# Patient Record
Sex: MALE | Race: WHITE | Employment: FULL TIME | URBAN - METROPOLITAN AREA
[De-identification: names, ages, dates, MRNs, and addresses within clinical notes are randomized per-mention and may not be internally consistent; named-entity substitution may affect disease eponyms.]

---

## 2017-02-07 ENCOUNTER — APPOINTMENT (OUTPATIENT)
Dept: GENERAL RADIOLOGY | Facility: HOSPITAL | Age: 52
End: 2017-02-07
Attending: NURSE PRACTITIONER
Payer: COMMERCIAL

## 2017-02-07 ENCOUNTER — HOSPITAL ENCOUNTER (EMERGENCY)
Facility: HOSPITAL | Age: 52
Discharge: HOME | End: 2017-02-07
Payer: COMMERCIAL

## 2017-02-07 VITALS
DIASTOLIC BLOOD PRESSURE: 86 MMHG | OXYGEN SATURATION: 99 % | HEART RATE: 85 BPM | HEIGHT: 66 IN | TEMPERATURE: 99 F | WEIGHT: 215 LBS | RESPIRATION RATE: 16 BRPM | SYSTOLIC BLOOD PRESSURE: 145 MMHG | BODY MASS INDEX: 34.55 KG/M2

## 2017-02-07 DIAGNOSIS — S61.219A FINGER LACERATION, INITIAL ENCOUNTER: Primary | ICD-10-CM

## 2017-02-07 PROCEDURE — 12002 RPR S/N/AX/GEN/TRNK2.6-7.5CM: CPT

## 2017-02-07 PROCEDURE — 99283 EMERGENCY DEPT VISIT LOW MDM: CPT

## 2017-02-07 PROCEDURE — 73140 X-RAY EXAM OF FINGER(S): CPT

## 2017-02-07 RX ORDER — LIDOCAINE HYDROCHLORIDE 10 MG/ML
INJECTION, SOLUTION EPIDURAL; INFILTRATION; INTRACAUDAL; PERINEURAL
Status: COMPLETED
Start: 2017-02-07 | End: 2017-02-07

## 2017-02-07 RX ORDER — CEPHALEXIN 250 MG/5ML
500 POWDER, FOR SUSPENSION ORAL 4 TIMES DAILY
Qty: 280 ML | Refills: 0 | Status: SHIPPED | OUTPATIENT
Start: 2017-02-07 | End: 2017-02-14

## 2017-02-07 NOTE — ED NOTES
Pt states approx 12:00 cut left index finger on metal blade. Thinks last tetanus shot was within 5 years.

## 2017-02-07 NOTE — ED PROVIDER NOTES
Patient Seen in: [facility] AND [facility] Emergency Department    History   CC: finger laceration  HPI: Sp Burton 54year old male  who presents to the ER c/o left index finger laceration status post injury today while he was working outside.   States he ch Current:/88 mmHg  Pulse 90  Temp(Src) 99 °F (37.2 °C) (Oral)  Resp 16  Ht 167.6 cm (5' 6\")  Wt 97.523 kg  BMI 34.72 kg/m2  SpO2 98%        General - Appears well and in NAD  Skin - +5cm laceration well approximated edges noted to the dorsal as Susp  Take 10 mL (500 mg total) by mouth 4 (four) times daily.   Qty: 280 mL Refills: 0

## 2017-03-28 ENCOUNTER — OFFICE VISIT (OUTPATIENT)
Dept: FAMILY MEDICINE CLINIC | Facility: CLINIC | Age: 52
End: 2017-03-28

## 2017-03-28 VITALS
BODY MASS INDEX: 35.36 KG/M2 | TEMPERATURE: 97 F | HEIGHT: 66 IN | HEART RATE: 63 BPM | SYSTOLIC BLOOD PRESSURE: 130 MMHG | WEIGHT: 220 LBS | DIASTOLIC BLOOD PRESSURE: 84 MMHG

## 2017-03-28 DIAGNOSIS — Z91.030 ALLERGIC TO BEES: Primary | ICD-10-CM

## 2017-03-28 PROCEDURE — 99212 OFFICE O/P EST SF 10 MIN: CPT | Performed by: FAMILY MEDICINE

## 2017-03-28 PROCEDURE — 99213 OFFICE O/P EST LOW 20 MIN: CPT | Performed by: FAMILY MEDICINE

## 2017-03-28 RX ORDER — EPINEPHRINE 0.3 MG/.3ML
0.3 INJECTION SUBCUTANEOUS ONCE
Qty: 2 EACH | Refills: 3 | Status: SHIPPED | OUTPATIENT
Start: 2017-03-28 | End: 2018-05-15

## 2017-03-28 NOTE — PROGRESS NOTES
Patient ID: Ivis Lewis is a 54year old male. HPI  Patient presents with:  New Prescription: pt wants epi pen. allergic to bees    He is allergic to bees. He found this out in 2004 when he started having highs and his throat was closing down.   Stevo Givens person, place, and time. Skin: Skin is warm and dry. Psychiatric: Patient has a normal mood and affect. Nursing note and vitals reviewed.          ASSESSMENT/PLAN:     Diagnoses and all orders for this visit:    Allergic to bees  -     EPINEPHrine (EP

## 2017-04-20 ENCOUNTER — OFFICE VISIT (OUTPATIENT)
Dept: OTOLARYNGOLOGY | Facility: CLINIC | Age: 52
End: 2017-04-20

## 2017-04-20 ENCOUNTER — OFFICE VISIT (OUTPATIENT)
Dept: AUDIOLOGY | Facility: CLINIC | Age: 52
End: 2017-04-20

## 2017-04-20 VITALS
TEMPERATURE: 98 F | BODY MASS INDEX: 34.07 KG/M2 | SYSTOLIC BLOOD PRESSURE: 144 MMHG | DIASTOLIC BLOOD PRESSURE: 92 MMHG | WEIGHT: 212 LBS | HEIGHT: 66 IN

## 2017-04-20 DIAGNOSIS — R42 DIZZINESS: Primary | ICD-10-CM

## 2017-04-20 PROCEDURE — 99203 OFFICE O/P NEW LOW 30 MIN: CPT | Performed by: OTOLARYNGOLOGY

## 2017-04-20 PROCEDURE — 92557 COMPREHENSIVE HEARING TEST: CPT | Performed by: AUDIOLOGIST

## 2017-04-20 PROCEDURE — 99212 OFFICE O/P EST SF 10 MIN: CPT | Performed by: OTOLARYNGOLOGY

## 2017-04-20 PROCEDURE — 92567 TYMPANOMETRY: CPT | Performed by: AUDIOLOGIST

## 2017-04-20 RX ORDER — MECLIZINE HYDROCHLORIDE 25 MG/1
TABLET ORAL
Qty: 20 TABLET | Refills: 0 | Status: SHIPPED | OUTPATIENT
Start: 2017-04-20 | End: 2018-01-08

## 2017-04-20 NOTE — PROGRESS NOTES
Arslan Kirk is a 54year old male. Patient presents with:  Dizziness: Patient c/o dizziness when he lay on his left side. HISTORY OF PRESENT ILLNESS  4/20/2017 4/20/2017   The patient has complaints of dizziness. Pt reports rotary vertigo.  Sx's (36.8 °C)  Ht 5' 6\" (1.676 m)  Wt 212 lb (96.163 kg)  BMI 34.23 kg/m2       Constitutional Normal Overall appearance - Normal.   Psychiatric Normal Orientation - Oriented to time, place, person & situation. Appropriate mood and affect.    Neck Exam Normal allergy, or preceding illness. Since the patient has no other symptoms such as: hearing loss, pain, double vision, numbness or weakness or other cranial nerve issues I did not recommend an MRI at this time.  I recommend renetta's exercises and followup if

## 2017-04-20 NOTE — PROGRESS NOTES
AUDIOGRAM     Ayala Duran was referred for testing by Perico Hackett for dizziness. 6/21/1965  [id number]    Otoscopic inspection: right ear no cerumen; left ear no cerumen.        Tests/Procedures  Patient was tested via  standard insert earphones and

## 2018-01-08 ENCOUNTER — OFFICE VISIT (OUTPATIENT)
Dept: FAMILY MEDICINE CLINIC | Facility: CLINIC | Age: 53
End: 2018-01-08

## 2018-01-08 ENCOUNTER — APPOINTMENT (OUTPATIENT)
Dept: LAB | Age: 53
End: 2018-01-08
Attending: NURSE PRACTITIONER
Payer: COMMERCIAL

## 2018-01-08 VITALS
SYSTOLIC BLOOD PRESSURE: 128 MMHG | HEIGHT: 66 IN | WEIGHT: 222 LBS | DIASTOLIC BLOOD PRESSURE: 82 MMHG | BODY MASS INDEX: 35.68 KG/M2

## 2018-01-08 DIAGNOSIS — Z00.00 WELL ADULT EXAM: ICD-10-CM

## 2018-01-08 DIAGNOSIS — Z12.5 SCREENING PSA (PROSTATE SPECIFIC ANTIGEN): ICD-10-CM

## 2018-01-08 DIAGNOSIS — Z00.00 WELL ADULT EXAM: Primary | ICD-10-CM

## 2018-01-08 DIAGNOSIS — Z12.11 SCREENING FOR COLON CANCER: ICD-10-CM

## 2018-01-08 LAB
ALBUMIN SERPL BCP-MCNC: 4.4 G/DL (ref 3.5–4.8)
ALBUMIN/GLOB SERPL: 2.1 {RATIO} (ref 1–2)
ALP SERPL-CCNC: 56 U/L (ref 32–100)
ALT SERPL-CCNC: 64 U/L (ref 17–63)
ANION GAP SERPL CALC-SCNC: 6 MMOL/L (ref 0–18)
AST SERPL-CCNC: 26 U/L (ref 15–41)
BILIRUB SERPL-MCNC: 1.1 MG/DL (ref 0.3–1.2)
BILIRUB UR QL: NEGATIVE
BUN SERPL-MCNC: 13 MG/DL (ref 8–20)
BUN/CREAT SERPL: 17.6 (ref 10–20)
CALCIUM SERPL-MCNC: 9.1 MG/DL (ref 8.5–10.5)
CHLORIDE SERPL-SCNC: 108 MMOL/L (ref 95–110)
CHOLEST SERPL-MCNC: 170 MG/DL (ref 110–200)
CLARITY UR: CLEAR
CO2 SERPL-SCNC: 26 MMOL/L (ref 22–32)
COLOR UR: YELLOW
CREAT SERPL-MCNC: 0.74 MG/DL (ref 0.5–1.5)
ERYTHROCYTE [DISTWIDTH] IN BLOOD BY AUTOMATED COUNT: 13.5 % (ref 11–15)
GLOBULIN PLAS-MCNC: 2.1 G/DL (ref 2.5–3.7)
GLUCOSE SERPL-MCNC: 98 MG/DL (ref 70–99)
GLUCOSE UR-MCNC: NEGATIVE MG/DL
HBA1C MFR BLD: 5.4 % (ref 4–6)
HCT VFR BLD AUTO: 45.8 % (ref 41–52)
HDLC SERPL-MCNC: 36 MG/DL
HGB BLD-MCNC: 15.9 G/DL (ref 13.5–17.5)
HGB UR QL STRIP.AUTO: NEGATIVE
KETONES UR-MCNC: NEGATIVE MG/DL
LDLC SERPL CALC-MCNC: 114 MG/DL (ref 0–99)
LEUKOCYTE ESTERASE UR QL STRIP.AUTO: NEGATIVE
MCH RBC QN AUTO: 30.2 PG (ref 27–32)
MCHC RBC AUTO-ENTMCNC: 34.7 G/DL (ref 32–37)
MCV RBC AUTO: 87.2 FL (ref 80–100)
NITRITE UR QL STRIP.AUTO: NEGATIVE
NONHDLC SERPL-MCNC: 134 MG/DL
OSMOLALITY UR CALC.SUM OF ELEC: 290 MOSM/KG (ref 275–295)
PH UR: 6 [PH] (ref 5–8)
PLATELET # BLD AUTO: 100 K/UL (ref 140–400)
PMV BLD AUTO: 12.2 FL (ref 7.4–10.3)
POTASSIUM SERPL-SCNC: 3.6 MMOL/L (ref 3.3–5.1)
PROT SERPL-MCNC: 6.5 G/DL (ref 5.9–8.4)
PROT UR-MCNC: NEGATIVE MG/DL
PSA SERPL-MCNC: 0.5 NG/ML (ref 0–4)
RBC # BLD AUTO: 5.26 M/UL (ref 4.5–5.9)
SODIUM SERPL-SCNC: 140 MMOL/L (ref 136–144)
SP GR UR STRIP: 1.02 (ref 1–1.03)
TRIGL SERPL-MCNC: 98 MG/DL (ref 1–149)
TSH SERPL-ACNC: 4.58 UIU/ML (ref 0.45–5.33)
UROBILINOGEN UR STRIP-ACNC: <2
VIT B12 SERPL-MCNC: >1500 PG/ML (ref 181–914)
VIT C UR-MCNC: NEGATIVE MG/DL
WBC # BLD AUTO: 6.6 K/UL (ref 4–11)

## 2018-01-08 PROCEDURE — 80061 LIPID PANEL: CPT

## 2018-01-08 PROCEDURE — 84443 ASSAY THYROID STIM HORMONE: CPT

## 2018-01-08 PROCEDURE — 83036 HEMOGLOBIN GLYCOSYLATED A1C: CPT

## 2018-01-08 PROCEDURE — 82607 VITAMIN B-12: CPT

## 2018-01-08 PROCEDURE — 36415 COLL VENOUS BLD VENIPUNCTURE: CPT

## 2018-01-08 PROCEDURE — 82306 VITAMIN D 25 HYDROXY: CPT

## 2018-01-08 PROCEDURE — 99396 PREV VISIT EST AGE 40-64: CPT | Performed by: NURSE PRACTITIONER

## 2018-01-08 PROCEDURE — 81003 URINALYSIS AUTO W/O SCOPE: CPT

## 2018-01-08 PROCEDURE — 85027 COMPLETE CBC AUTOMATED: CPT

## 2018-01-08 PROCEDURE — 80053 COMPREHEN METABOLIC PANEL: CPT

## 2018-01-08 NOTE — PROGRESS NOTES
HPI    Pt presents for annual medial exam.  Denies acute or chronic health concerns or problems. Would like labs ordered today. Would like to do Fit testing instead of colonoscopy-had colonoscopy at age 41. Declines flu shot.     Review of Systems   Cons on file     Other Topics Concern   None on file     Social History Narrative   None on file         Current Outpatient Prescriptions:  NEXIUM 40 MG Oral Capsule Delayed Release 1 CAPSULE(S) BY MOUTH DAILY Disp:  Rfl: 4       Allergies:    Bee Venom Placed This Encounter      CBC, Platelet, No Differential [E]      Comp Metabolic Panel (14) [E]      Hemoglobin A1C [E]      Lipid Panel [E]      PSA (Screening) [E]      TSH W Reflex To Free T4 [E]      Urinalysis, Routine [E]      Vitamin B12 [E]      V

## 2018-01-10 ENCOUNTER — APPOINTMENT (OUTPATIENT)
Dept: LAB | Age: 53
End: 2018-01-10
Attending: NURSE PRACTITIONER
Payer: COMMERCIAL

## 2018-01-10 DIAGNOSIS — Z12.11 SCREENING FOR COLON CANCER: ICD-10-CM

## 2018-01-10 LAB — 25(OH)D3 SERPL-MCNC: 31.4 NG/ML

## 2018-01-10 PROCEDURE — 82274 ASSAY TEST FOR BLOOD FECAL: CPT

## 2018-01-11 DIAGNOSIS — E78.5 HYPERLIPIDEMIA, UNSPECIFIED HYPERLIPIDEMIA TYPE: ICD-10-CM

## 2018-01-11 DIAGNOSIS — R74.8 ELEVATED LIVER ENZYMES: ICD-10-CM

## 2018-01-11 DIAGNOSIS — D69.6 THROMBOCYTOPENIA (HCC): Primary | ICD-10-CM

## 2018-01-11 LAB — HEMOCCULT STL QL: NEGATIVE

## 2018-05-15 DIAGNOSIS — Z91.030 ALLERGIC TO BEES: ICD-10-CM

## 2018-05-15 RX ORDER — EPINEPHRINE 0.3 MG/.3ML
0.3 INJECTION SUBCUTANEOUS ONCE
Qty: 2 EACH | Refills: 3 | Status: SHIPPED | OUTPATIENT
Start: 2018-05-15 | End: 2019-05-21

## 2018-05-15 NOTE — TELEPHONE ENCOUNTER
Pt called in stating that he has been waiting on a request for a refill on script below. Pt was informed by his pharmacy that nothing was sent over.  Pt is request 2 packs of 2 pens (one for work and one for home) at the cheapest generic brand he can receiv

## 2018-05-16 ENCOUNTER — TELEPHONE (OUTPATIENT)
Dept: FAMILY MEDICINE CLINIC | Facility: CLINIC | Age: 53
End: 2018-05-16

## 2018-05-16 NOTE — TELEPHONE ENCOUNTER
Kiera Hassan. APN see message below from [facility] and advise.     Refill Protocol Appointment Criteria  · Appointment scheduled in the past 6 months or in the next 3 months  Recent Outpatient Visits            4 months ago Well adult exam    [address]

## 2018-05-17 NOTE — TELEPHONE ENCOUNTER
Advised [facility] pharmacist Jeri Jerez of Chema Baltazar note. Pharmacist verbalized understanding.

## 2018-09-22 ENCOUNTER — APPOINTMENT (OUTPATIENT)
Dept: LAB | Age: 53
End: 2018-09-22
Attending: NURSE PRACTITIONER
Payer: COMMERCIAL

## 2018-09-22 DIAGNOSIS — E78.5 HYPERLIPIDEMIA, UNSPECIFIED HYPERLIPIDEMIA TYPE: ICD-10-CM

## 2018-09-22 DIAGNOSIS — D69.6 THROMBOCYTOPENIA (HCC): ICD-10-CM

## 2018-09-22 DIAGNOSIS — R74.8 ELEVATED LIVER ENZYMES: ICD-10-CM

## 2018-09-22 LAB
ALBUMIN SERPL BCP-MCNC: 4.2 G/DL (ref 3.5–4.8)
ALBUMIN/GLOB SERPL: 1.9 {RATIO} (ref 1–2)
ALP SERPL-CCNC: 71 U/L (ref 32–100)
ALT SERPL-CCNC: 38 U/L (ref 17–63)
ANION GAP SERPL CALC-SCNC: 7 MMOL/L (ref 0–18)
AST SERPL-CCNC: 24 U/L (ref 15–41)
BILIRUB SERPL-MCNC: 0.9 MG/DL (ref 0.3–1.2)
BUN SERPL-MCNC: 18 MG/DL (ref 8–20)
BUN/CREAT SERPL: 25.7 (ref 10–20)
CALCIUM SERPL-MCNC: 9.1 MG/DL (ref 8.5–10.5)
CHLORIDE SERPL-SCNC: 109 MMOL/L (ref 95–110)
CHOLEST SERPL-MCNC: 143 MG/DL (ref 110–200)
CO2 SERPL-SCNC: 26 MMOL/L (ref 22–32)
CREAT SERPL-MCNC: 0.7 MG/DL (ref 0.5–1.5)
ERYTHROCYTE [DISTWIDTH] IN BLOOD BY AUTOMATED COUNT: 13.1 % (ref 11–15)
GLOBULIN PLAS-MCNC: 2.2 G/DL (ref 2.5–3.7)
GLUCOSE SERPL-MCNC: 101 MG/DL (ref 70–99)
HCT VFR BLD AUTO: 42.4 % (ref 41–52)
HDLC SERPL-MCNC: 42 MG/DL
HGB BLD-MCNC: 14.5 G/DL (ref 13.5–17.5)
LDLC SERPL CALC-MCNC: 91 MG/DL (ref 0–99)
MCH RBC QN AUTO: 30 PG (ref 27–32)
MCHC RBC AUTO-ENTMCNC: 34.3 G/DL (ref 32–37)
MCV RBC AUTO: 87.4 FL (ref 80–100)
NONHDLC SERPL-MCNC: 101 MG/DL
OSMOLALITY UR CALC.SUM OF ELEC: 296 MOSM/KG (ref 275–295)
PATIENT FASTING: YES
PLATELET # BLD AUTO: 97 K/UL (ref 140–400)
PMV BLD AUTO: 11.5 FL (ref 7.4–10.3)
POTASSIUM SERPL-SCNC: 3.9 MMOL/L (ref 3.3–5.1)
PROT SERPL-MCNC: 6.4 G/DL (ref 5.9–8.4)
RBC # BLD AUTO: 4.85 M/UL (ref 4.5–5.9)
SODIUM SERPL-SCNC: 142 MMOL/L (ref 136–144)
TRIGL SERPL-MCNC: 48 MG/DL (ref 1–149)
WBC # BLD AUTO: 5.2 K/UL (ref 4–11)

## 2018-09-22 PROCEDURE — 36415 COLL VENOUS BLD VENIPUNCTURE: CPT

## 2018-09-22 PROCEDURE — 80061 LIPID PANEL: CPT

## 2018-09-22 PROCEDURE — 85027 COMPLETE CBC AUTOMATED: CPT

## 2018-09-22 PROCEDURE — 80053 COMPREHEN METABOLIC PANEL: CPT

## 2018-09-23 DIAGNOSIS — D69.6 THROMBOCYTOPENIA (HCC): Primary | ICD-10-CM

## 2018-10-08 ENCOUNTER — OFFICE VISIT (OUTPATIENT)
Dept: HEMATOLOGY/ONCOLOGY | Facility: HOSPITAL | Age: 53
End: 2018-10-08
Attending: INTERNAL MEDICINE
Payer: COMMERCIAL

## 2018-10-08 VITALS
BODY MASS INDEX: 33.59 KG/M2 | SYSTOLIC BLOOD PRESSURE: 125 MMHG | WEIGHT: 209 LBS | HEART RATE: 90 BPM | DIASTOLIC BLOOD PRESSURE: 77 MMHG | TEMPERATURE: 99 F | RESPIRATION RATE: 18 BRPM | HEIGHT: 66 IN

## 2018-10-08 DIAGNOSIS — D69.6 THROMBOCYTOPENIA (HCC): Primary | ICD-10-CM

## 2018-10-08 PROCEDURE — 99244 OFF/OP CNSLTJ NEW/EST MOD 40: CPT | Performed by: INTERNAL MEDICINE

## 2018-10-09 NOTE — CONSULTS
[facility]    PATIENT'S NAME: Ridge Sanches   CONSULTING PHYSICIAN: Gurjit Block MD   PATIENT ACCOUNT #: [de-identified] LOCATION: [address] [place] [address] RECORD #: [id number] YOB: 1965   CONSULTATION DATE: 10/08/2018       CANCER CENT lesions. LYMPHATICS:  No palpable peripheral adenopathy on survey. NEUROLOGIC:  Moving all extremities. Cranial nerves intact. PSYCHIATRIC:  Appropriate mood, appropriate affect. MUSCULOSKELETAL:  No deformity or bony tenderness.     LABORATORY DATA:

## 2018-12-20 ENCOUNTER — TELEPHONE (OUTPATIENT)
Dept: HEMATOLOGY/ONCOLOGY | Facility: HOSPITAL | Age: 53
End: 2018-12-20

## 2019-02-04 ENCOUNTER — APPOINTMENT (OUTPATIENT)
Dept: HEMATOLOGY/ONCOLOGY | Facility: HOSPITAL | Age: 54
End: 2019-02-04
Attending: INTERNAL MEDICINE
Payer: COMMERCIAL

## 2019-02-05 ENCOUNTER — APPOINTMENT (OUTPATIENT)
Dept: HEMATOLOGY/ONCOLOGY | Facility: HOSPITAL | Age: 54
End: 2019-02-05
Attending: INTERNAL MEDICINE
Payer: COMMERCIAL

## 2019-02-08 ENCOUNTER — LAB ENCOUNTER (OUTPATIENT)
Dept: LAB | Age: 54
End: 2019-02-08
Attending: INTERNAL MEDICINE
Payer: COMMERCIAL

## 2019-02-08 DIAGNOSIS — D69.6 THROMBOCYTOPENIA (HCC): ICD-10-CM

## 2019-02-08 LAB
BASOPHILS # BLD AUTO: 0.04 X10(3) UL (ref 0–0.2)
BASOPHILS NFR BLD AUTO: 0.7 %
DEPRECATED RDW RBC AUTO: 39.8 FL (ref 35.1–46.3)
EOSINOPHIL # BLD AUTO: 0.27 X10(3) UL (ref 0–0.7)
EOSINOPHIL NFR BLD AUTO: 4.6 %
ERYTHROCYTE [DISTWIDTH] IN BLOOD BY AUTOMATED COUNT: 12.8 % (ref 11–15)
HCT VFR BLD AUTO: 44.4 % (ref 39–53)
HGB BLD-MCNC: 15.3 G/DL (ref 13–17.5)
IMM GRANULOCYTES # BLD AUTO: 0.02 X10(3) UL (ref 0–1)
IMM GRANULOCYTES NFR BLD: 0.3 %
LYMPHOCYTES # BLD AUTO: 1.83 X10(3) UL (ref 1–4)
LYMPHOCYTES NFR BLD AUTO: 31.1 %
MCH RBC QN AUTO: 29.9 PG (ref 26–34)
MCHC RBC AUTO-ENTMCNC: 34.5 G/DL (ref 31–37)
MCV RBC AUTO: 86.9 FL (ref 80–100)
MONOCYTES # BLD AUTO: 0.45 X10(3) UL (ref 0.1–1)
MONOCYTES NFR BLD AUTO: 7.6 %
NEUTROPHILS # BLD AUTO: 3.28 X10 (3) UL (ref 1.5–7.7)
NEUTROPHILS # BLD AUTO: 3.28 X10(3) UL (ref 1.5–7.7)
NEUTROPHILS NFR BLD AUTO: 55.7 %
PLATELET # BLD AUTO: 117 10(3)UL (ref 150–450)
RBC # BLD AUTO: 5.11 X10(6)UL (ref 4.3–5.7)
WBC # BLD AUTO: 5.9 X10(3) UL (ref 4–11)

## 2019-02-08 PROCEDURE — 85060 BLOOD SMEAR INTERPRETATION: CPT

## 2019-02-08 PROCEDURE — 85025 COMPLETE CBC W/AUTO DIFF WBC: CPT

## 2019-02-08 PROCEDURE — 36415 COLL VENOUS BLD VENIPUNCTURE: CPT

## 2019-02-13 ENCOUNTER — OFFICE VISIT (OUTPATIENT)
Dept: FAMILY MEDICINE CLINIC | Facility: CLINIC | Age: 54
End: 2019-02-13
Payer: COMMERCIAL

## 2019-02-13 VITALS
HEART RATE: 86 BPM | BODY MASS INDEX: 35.2 KG/M2 | SYSTOLIC BLOOD PRESSURE: 126 MMHG | HEIGHT: 66 IN | WEIGHT: 219 LBS | DIASTOLIC BLOOD PRESSURE: 76 MMHG

## 2019-02-13 DIAGNOSIS — Z00.00 WELL ADULT HEALTH CHECK: ICD-10-CM

## 2019-02-13 DIAGNOSIS — E78.5 HYPERLIPIDEMIA, UNSPECIFIED HYPERLIPIDEMIA TYPE: Primary | ICD-10-CM

## 2019-02-13 DIAGNOSIS — D69.6 THROMBOCYTOPENIA (HCC): ICD-10-CM

## 2019-02-13 DIAGNOSIS — Z12.11 SCREENING FOR COLON CANCER: ICD-10-CM

## 2019-02-13 DIAGNOSIS — R74.8 ELEVATED LIVER ENZYMES: ICD-10-CM

## 2019-02-13 DIAGNOSIS — Z12.5 SCREENING PSA (PROSTATE SPECIFIC ANTIGEN): ICD-10-CM

## 2019-02-13 PROBLEM — Z00.129 WELL ADOLESCENT VISIT WITHOUT ABNORMAL FINDINGS: Status: ACTIVE | Noted: 2019-02-13

## 2019-02-13 PROCEDURE — 99396 PREV VISIT EST AGE 40-64: CPT | Performed by: NURSE PRACTITIONER

## 2019-02-14 NOTE — PATIENT INSTRUCTIONS
Well-Child Checkup: 15 to 25 Years     Stay involved in your teen’s life. Make sure your teen knows you’re always there when he or she needs to talk. During the teen years, it’s important to keep having yearly checkups.  Your teen may be embarrassed abo · Body changes. The body grows and matures during puberty. Hair will grow in the pubic area and on other parts of the body. Girls grow breasts and menstruate (have monthly periods). A boy’s voice changes, becoming lower and deeper.  As the penis matures, er · Eat healthy. Your child should eat fruits, vegetables, lean meats, and whole grains every day. Less healthy foods—like french fries, candy, and chips—should be eaten rarely.  Some teens fall into the trap of snacking on junk food and fast food throughout · Encourage your teen to keep a consistent bedtime, even on weekends. Sleeping is easier when the body follows a routine. Don’t let your teen stay up too late at night or sleep in too long in the morning. · Help your teen wake up, if needed.  Go into the b · Set rules and limits around driving and use of the car. If your teen gets a ticket or has an accident, there should be consequences. Driving is a privilege that can be taken away if your child doesn’t follow the rules.   · Teach your child to make good de © 0535-9013 [employer] [address]. [address] [facility], [address] [place]. All rights reserved. This information is not intended as a substitute for professional medical care. Always follow your healthcare professional's instructions.         Prevent Type 2 diabetes All men with prediabetes Every year   Hepatitis C Men at increased risk for infection – talk with your healthcare provider At routine exams. All men ages 48 to 79 should be tested at least once for hepatitis C.    High cholesterol or triglyc Measles, mumps, rubella (MMR) Men in this age group through their late 46s who have no record of these infections or vaccines 1 or 2 doses; ask your healthcare provider   Meningococcal Men at increased risk for infection – talk with your healthcare provide Your healthcare provider will give you information on diet changes you may need to make. Your provider may recommend that you see a registered dietitian for help with diet changes.  Changes may include:  · Cutting back on the amount of fat and cholesterol i If you are overweight or obese, your healthcare provider will work with you to help you lose weight and lower your BMI (body mass index).  Making diet changes and getting more physical activity can help. Changing your diet will help you lose weight more eas · Don’t skip a dose or stop taking your medicine because you feel better or because your cholesterol numbers go down. Never stop taking your medicine unless your healthcare provider has told you it’s OK.   · Ask your healthcare provider if you have any ques © 8700-5437 [employer] [address]. [address] [facility], [facility] [place]. All rights reserved. This information is not intended as a substitute for professional medical care. Always follow your healthcare professional's instructions.

## 2019-02-14 NOTE — PROGRESS NOTES
HPI  Pt here for physical.   Was seen by hematology in October 2018 for thrombocytopenia. Cbc done yesterday. Denies acute or chronic complaints    Trying to watch diet. Exercises daily. Colonoscopy-done at age 42 years. Declines flu shot.    R file      Highest education level: Not on file    Occupational History      Not on file    Social Needs      Financial resource strain: Not on file      Food insecurity:        Worry: Not on file        Inability: Not on file      Transportation needs: well-nourished. No distress. HENT:   Head: Normocephalic and atraumatic.    Right Ear: Tympanic membrane and ear canal normal. No cerumen present  Left Ear: Tympanic membrane and ear canal normal. No cerumen present  Eyes: Conjunctivae and EOM are normal. combination of weight resistance and cardio is preferred. Relevant Orders    COMP METABOLIC PANEL (14)    LIPID PANEL    PSA SCREEN    TSH W REFLEX TO FREE T4                  Discussed plan of care with pt and pt is in agreement. All questions

## 2019-02-15 ENCOUNTER — APPOINTMENT (OUTPATIENT)
Dept: HEMATOLOGY/ONCOLOGY | Facility: HOSPITAL | Age: 54
End: 2019-02-15
Attending: INTERNAL MEDICINE
Payer: COMMERCIAL

## 2019-02-17 NOTE — ASSESSMENT & PLAN NOTE
Screening labs  Declines flu shot  Refer gi colonoscopy    Please aim to eat a diet high in fresh fruits and vegetables, lean protein sources, complex carbohydrates and limited processed and fast foods.   Try to get at least 150 minutes of exercise per week

## 2019-03-12 ENCOUNTER — APPOINTMENT (OUTPATIENT)
Dept: LAB | Age: 54
End: 2019-03-12
Attending: NURSE PRACTITIONER
Payer: COMMERCIAL

## 2019-03-12 DIAGNOSIS — R74.8 ELEVATED LIVER ENZYMES: ICD-10-CM

## 2019-03-12 DIAGNOSIS — Z00.00 WELL ADULT HEALTH CHECK: ICD-10-CM

## 2019-03-12 DIAGNOSIS — E78.2 MIXED HYPERLIPIDEMIA: ICD-10-CM

## 2019-03-12 DIAGNOSIS — R74.8 ELEVATED LIVER ENZYMES: Primary | ICD-10-CM

## 2019-03-12 DIAGNOSIS — Z12.5 SCREENING PSA (PROSTATE SPECIFIC ANTIGEN): ICD-10-CM

## 2019-03-12 DIAGNOSIS — E78.5 HYPERLIPIDEMIA, UNSPECIFIED HYPERLIPIDEMIA TYPE: ICD-10-CM

## 2019-03-12 LAB
ALBUMIN SERPL-MCNC: 4.2 G/DL (ref 3.4–5)
ALBUMIN/GLOB SERPL: 1.6 {RATIO} (ref 1–2)
ALP LIVER SERPL-CCNC: 71 U/L (ref 45–117)
ALT SERPL-CCNC: 73 U/L (ref 16–61)
ANION GAP SERPL CALC-SCNC: 7 MMOL/L (ref 0–18)
AST SERPL-CCNC: 27 U/L (ref 15–37)
BILIRUB SERPL-MCNC: 0.8 MG/DL (ref 0.1–2)
BUN BLD-MCNC: 14 MG/DL (ref 7–18)
BUN/CREAT SERPL: 17.1 (ref 10–20)
CALCIUM BLD-MCNC: 9.2 MG/DL (ref 8.5–10.1)
CHLORIDE SERPL-SCNC: 106 MMOL/L (ref 98–107)
CHOLEST SMN-MCNC: 163 MG/DL (ref ?–200)
CO2 SERPL-SCNC: 28 MMOL/L (ref 21–32)
COMPLEXED PSA SERPL-MCNC: 0.61 NG/ML (ref ?–4)
CREAT BLD-MCNC: 0.82 MG/DL (ref 0.7–1.3)
GLOBULIN PLAS-MCNC: 2.7 G/DL (ref 2.8–4.4)
GLUCOSE BLD-MCNC: 86 MG/DL (ref 70–99)
HDLC SERPL-MCNC: 33 MG/DL (ref 40–59)
LDLC SERPL CALC-MCNC: 100 MG/DL (ref ?–100)
M PROTEIN MFR SERPL ELPH: 6.9 G/DL (ref 6.4–8.2)
NONHDLC SERPL-MCNC: 130 MG/DL (ref ?–130)
OSMOLALITY SERPL CALC.SUM OF ELEC: 292 MOSM/KG (ref 275–295)
POTASSIUM SERPL-SCNC: 3.7 MMOL/L (ref 3.5–5.1)
SODIUM SERPL-SCNC: 141 MMOL/L (ref 136–145)
TRIGL SERPL-MCNC: 150 MG/DL (ref 30–149)
TSI SER-ACNC: 3.74 MIU/ML (ref 0.36–3.74)
VLDLC SERPL CALC-MCNC: 30 MG/DL (ref 0–30)

## 2019-03-12 PROCEDURE — 80053 COMPREHEN METABOLIC PANEL: CPT

## 2019-03-12 PROCEDURE — 36415 COLL VENOUS BLD VENIPUNCTURE: CPT

## 2019-03-12 PROCEDURE — 84443 ASSAY THYROID STIM HORMONE: CPT

## 2019-03-12 PROCEDURE — 80061 LIPID PANEL: CPT

## 2019-05-10 ENCOUNTER — HOSPITAL ENCOUNTER (OUTPATIENT)
Age: 54
Discharge: HOME | End: 2019-05-10
Attending: EMERGENCY MEDICINE
Payer: COMMERCIAL

## 2019-05-10 VITALS
WEIGHT: 210 LBS | BODY MASS INDEX: 33.75 KG/M2 | HEART RATE: 97 BPM | HEIGHT: 66 IN | OXYGEN SATURATION: 98 % | DIASTOLIC BLOOD PRESSURE: 80 MMHG | TEMPERATURE: 99 F | SYSTOLIC BLOOD PRESSURE: 158 MMHG | RESPIRATION RATE: 18 BRPM

## 2019-05-10 DIAGNOSIS — H66.001 NON-RECURRENT ACUTE SUPPURATIVE OTITIS MEDIA OF RIGHT EAR WITHOUT SPONTANEOUS RUPTURE OF TYMPANIC MEMBRANE: Primary | ICD-10-CM

## 2019-05-10 PROCEDURE — 99213 OFFICE O/P EST LOW 20 MIN: CPT

## 2019-05-10 PROCEDURE — 99214 OFFICE O/P EST MOD 30 MIN: CPT

## 2019-05-10 RX ORDER — AMOXICILLIN 500 MG/1
500 TABLET, FILM COATED ORAL 3 TIMES DAILY
Qty: 30 TABLET | Refills: 0 | Status: SHIPPED | OUTPATIENT
Start: 2019-05-10 | End: 2019-05-20

## 2019-05-10 NOTE — ED PROVIDER NOTES
Patient Seen in: [facility] Immediate Care In [address] [place] [address]    History   Patient presents with:  Cough/URI  Ear Problem Pain (neurosensory)    Stated Complaint: ear pain, sore throat    HPI    49 yo male with two weeks of URI symptoms with considerable sounds and intact distal pulses. Pulmonary/Chest: Effort normal and breath sounds normal.   Musculoskeletal: Normal range of motion. He exhibits no edema or tenderness. Lymphadenopathy:     He has cervical adenopathy.    Neurological: He is alert and or

## 2019-05-21 DIAGNOSIS — Z91.030 ALLERGIC TO BEES: ICD-10-CM

## 2019-05-22 RX ORDER — EPINEPHRINE 0.3 MG/.3ML
INJECTION SUBCUTANEOUS
Qty: 3 EACH | Refills: 1 | Status: SHIPPED | OUTPATIENT
Start: 2019-05-22 | End: 2021-03-12

## 2019-05-22 NOTE — TELEPHONE ENCOUNTER
LR 5-15-18 # 2 + 3    Review pended refill request as it does not fall under a protocol.   Requested Prescriptions     Pending Prescriptions Disp Refills   • EPINEPHRINE 0.3 MG/0.3ML Injection Solution Auto-injector [Pharmacy Med Name: EPINEPHRINE 0.3 MG

## 2019-06-08 ENCOUNTER — TELEPHONE (OUTPATIENT)
Dept: FAMILY MEDICINE CLINIC | Facility: CLINIC | Age: 54
End: 2019-06-08

## 2019-06-08 DIAGNOSIS — Z12.11 ENCOUNTER FOR SCREENING FECAL OCCULT BLOOD TESTING: Primary | ICD-10-CM

## 2019-06-08 NOTE — TELEPHONE ENCOUNTER
Patient states KK ordered a colonoscopy , Patient states he does not want to get colonoscopy, patient wants to know if there is any other option for him ? Please advise.

## 2019-06-14 ENCOUNTER — APPOINTMENT (OUTPATIENT)
Dept: LAB | Facility: HOSPITAL | Age: 54
End: 2019-06-14
Attending: FAMILY MEDICINE
Payer: COMMERCIAL

## 2019-06-17 ENCOUNTER — APPOINTMENT (OUTPATIENT)
Dept: LAB | Age: 54
End: 2019-06-17
Attending: NURSE PRACTITIONER
Payer: COMMERCIAL

## 2019-11-15 ENCOUNTER — TELEPHONE (OUTPATIENT)
Dept: FAMILY MEDICINE CLINIC | Facility: CLINIC | Age: 54
End: 2019-11-15

## 2019-11-15 NOTE — TELEPHONE ENCOUNTER
Left message- ext [phone] today only until 4:30pm  Patient is up to date on Tdap vaccine.      Immunization History   Administered Date(s) Administered   • TDAP 03/28/2015

## 2019-11-26 ENCOUNTER — OFFICE VISIT (OUTPATIENT)
Dept: FAMILY MEDICINE CLINIC | Facility: CLINIC | Age: 54
End: 2019-11-26
Payer: COMMERCIAL

## 2019-11-26 VITALS
HEIGHT: 66 IN | SYSTOLIC BLOOD PRESSURE: 139 MMHG | HEART RATE: 78 BPM | BODY MASS INDEX: 33.75 KG/M2 | TEMPERATURE: 99 F | DIASTOLIC BLOOD PRESSURE: 87 MMHG | WEIGHT: 210 LBS

## 2019-11-26 DIAGNOSIS — J06.9 VIRAL UPPER RESPIRATORY TRACT INFECTION: Primary | ICD-10-CM

## 2019-11-26 PROCEDURE — 99213 OFFICE O/P EST LOW 20 MIN: CPT | Performed by: NURSE PRACTITIONER

## 2019-11-26 RX ORDER — GUAIFENESIN AND CODEINE PHOSPHATE 100; 10 MG/5ML; MG/5ML
SOLUTION ORAL
Qty: 118 ML | Refills: 0 | Status: SHIPPED | OUTPATIENT
Start: 2019-11-26 | End: 2021-03-12

## 2019-11-26 NOTE — PROGRESS NOTES
HPI  Pt here for congestion and cough. Cough is worse at night. Feels like glass is in chest when he coughs. Had some runny nose and congestion early in the illness but that has improved. S/s started a couple of weeks ago.    Review of Systems   Constitut Spouse name: Not on file      Number of children: Not on file      Years of education: Not on file      Highest education level: Not on file    Occupational History      Not on file    Social Needs      Financial resource strain: Not on file      Food in cough as needed. May cause drowsiness 118 mL 0   • EPINEPHRINE 0.3 MG/0.3ML Injection Solution Auto-injector INJECT 0.3 AS DIRECTED ON TIME 3 each 1       Allergies:    Bee Venom                   Physical Exam   Nursing note and vitals reviewed.    Constit resolving. Relevant Medications    guaiFENesin-Codeine 100-10 MG/5ML Oral Syrup                  Discussed plan of care with pt and pt is in agreement. All questions answered. Pt to call with questions or concerns.       Encouraged to sign up for M

## 2019-11-27 NOTE — ASSESSMENT & PLAN NOTE
con't otc cough/cold meds  cheratussin ac 1 tsp po qid prn will cause drowsiness    Supportive care discussed to help alleviate symptoms  Please call if symptoms worsen or are not resolving.

## 2021-03-12 ENCOUNTER — OFFICE VISIT (OUTPATIENT)
Dept: FAMILY MEDICINE CLINIC | Facility: CLINIC | Age: 56
End: 2021-03-12
Payer: COMMERCIAL

## 2021-03-12 VITALS
HEIGHT: 66 IN | SYSTOLIC BLOOD PRESSURE: 135 MMHG | BODY MASS INDEX: 35.84 KG/M2 | DIASTOLIC BLOOD PRESSURE: 82 MMHG | WEIGHT: 223 LBS

## 2021-03-12 DIAGNOSIS — Z00.00 WELL ADULT EXAM: Primary | ICD-10-CM

## 2021-03-12 DIAGNOSIS — Z91.030 ALLERGIC TO BEES: ICD-10-CM

## 2021-03-12 DIAGNOSIS — Z12.11 SCREENING FOR COLON CANCER: ICD-10-CM

## 2021-03-12 DIAGNOSIS — Z12.5 SCREENING PSA (PROSTATE SPECIFIC ANTIGEN): ICD-10-CM

## 2021-03-12 PROBLEM — J06.9 VIRAL UPPER RESPIRATORY TRACT INFECTION: Status: RESOLVED | Noted: 2019-11-26 | Resolved: 2021-03-12

## 2021-03-12 PROCEDURE — 99396 PREV VISIT EST AGE 40-64: CPT | Performed by: NURSE PRACTITIONER

## 2021-03-12 PROCEDURE — 3008F BODY MASS INDEX DOCD: CPT | Performed by: NURSE PRACTITIONER

## 2021-03-12 PROCEDURE — 3075F SYST BP GE 130 - 139MM HG: CPT | Performed by: NURSE PRACTITIONER

## 2021-03-12 PROCEDURE — 3079F DIAST BP 80-89 MM HG: CPT | Performed by: NURSE PRACTITIONER

## 2021-03-12 RX ORDER — EPINEPHRINE 0.3 MG/.3ML
INJECTION SUBCUTANEOUS
Qty: 3 EACH | Refills: 1 | Status: SHIPPED | OUTPATIENT
Start: 2021-03-12

## 2021-03-12 NOTE — PROGRESS NOTES
HPI  Pt presents for well adult physical.  Denies acute or chronic health concerns or problems. Eats well; is not as active in the winter as he is in summer  Carries epi pen due to bee allergy.    Had first covid vaccine 3 weeks ago and is getting second o History    Socioeconomic History      Marital status: Single      Spouse name: Not on file      Number of children: Not on file      Years of education: Not on file      Highest education level: Not on file    Occupational History      Not on file    [place] EPINEPHrine 0.3 MG/0.3ML Injection Solution Auto-injector INJECT 0.3 AS DIRECTED ON TIME 3 each 1       Allergies:    Bee Venom                   Physical Exam  Vitals and nursing note reviewed. Constitutional:       General: He is not in acute distress. Other    Screening for colon cancer    Relevant Orders    OCCULT BLOOD, FECAL, IMMUNOASSAY    Screening PSA (prostate specific antigen)    Allergic to bees     Refill epipen         Relevant Medications    EPINEPHrine 0.3 MG/0.3ML Injection Solution Auto-i

## 2021-05-18 ENCOUNTER — LAB ENCOUNTER (OUTPATIENT)
Dept: LAB | Age: 56
End: 2021-05-18
Attending: NURSE PRACTITIONER
Payer: COMMERCIAL

## 2021-05-18 DIAGNOSIS — Z00.00 WELL ADULT EXAM: ICD-10-CM

## 2021-05-18 DIAGNOSIS — Z12.11 SCREENING FOR COLON CANCER: ICD-10-CM

## 2021-05-18 PROCEDURE — 84443 ASSAY THYROID STIM HORMONE: CPT

## 2021-05-18 PROCEDURE — 82306 VITAMIN D 25 HYDROXY: CPT

## 2021-05-18 PROCEDURE — 83036 HEMOGLOBIN GLYCOSYLATED A1C: CPT

## 2021-05-18 PROCEDURE — 80053 COMPREHEN METABOLIC PANEL: CPT

## 2021-05-18 PROCEDURE — 80061 LIPID PANEL: CPT

## 2021-05-18 PROCEDURE — 82274 ASSAY TEST FOR BLOOD FECAL: CPT

## 2021-05-18 PROCEDURE — 85027 COMPLETE CBC AUTOMATED: CPT

## 2021-05-18 PROCEDURE — 82607 VITAMIN B-12: CPT

## 2021-05-18 PROCEDURE — 36415 COLL VENOUS BLD VENIPUNCTURE: CPT

## 2021-07-26 ENCOUNTER — NURSE TRIAGE (OUTPATIENT)
Dept: FAMILY MEDICINE CLINIC | Facility: CLINIC | Age: 56
End: 2021-07-26

## 2021-07-26 NOTE — TELEPHONE ENCOUNTER
Action Requested: Summary for Provider     []  Critical Lab, Recommendations Needed  [] Need Additional Advice  []   FYI    []   Need Orders  [] Need Medications Sent to Pharmacy  []  Other     SUMMARY: appt made for Tuesday     Reason for call: Insect Bit

## 2021-07-27 ENCOUNTER — OFFICE VISIT (OUTPATIENT)
Dept: FAMILY MEDICINE CLINIC | Facility: CLINIC | Age: 56
End: 2021-07-27
Payer: COMMERCIAL

## 2021-07-27 VITALS
TEMPERATURE: 98 F | DIASTOLIC BLOOD PRESSURE: 90 MMHG | BODY MASS INDEX: 34.55 KG/M2 | HEIGHT: 66 IN | WEIGHT: 215 LBS | SYSTOLIC BLOOD PRESSURE: 138 MMHG | HEART RATE: 60 BPM | RESPIRATION RATE: 18 BRPM

## 2021-07-27 DIAGNOSIS — R03.0 ELEVATED BLOOD PRESSURE READING IN OFFICE WITHOUT DIAGNOSIS OF HYPERTENSION: ICD-10-CM

## 2021-07-27 DIAGNOSIS — L24.7 CONTACT DERMATITIS AND ECZEMA DUE TO PLANT: Primary | ICD-10-CM

## 2021-07-27 PROCEDURE — 3080F DIAST BP >= 90 MM HG: CPT | Performed by: NURSE PRACTITIONER

## 2021-07-27 PROCEDURE — 3008F BODY MASS INDEX DOCD: CPT | Performed by: NURSE PRACTITIONER

## 2021-07-27 PROCEDURE — 3075F SYST BP GE 130 - 139MM HG: CPT | Performed by: NURSE PRACTITIONER

## 2021-07-27 PROCEDURE — 99214 OFFICE O/P EST MOD 30 MIN: CPT | Performed by: NURSE PRACTITIONER

## 2021-07-27 RX ORDER — PREDNISONE 20 MG/1
TABLET ORAL
Qty: 10 TABLET | Refills: 0 | Status: SHIPPED | OUTPATIENT
Start: 2021-07-27

## 2021-07-27 RX ORDER — HYDROXYZINE HYDROCHLORIDE 25 MG/1
25 TABLET, FILM COATED ORAL EVERY 8 HOURS PRN
Qty: 15 TABLET | Refills: 0 | Status: SHIPPED | OUTPATIENT
Start: 2021-07-27

## 2021-07-27 NOTE — PATIENT INSTRUCTIONS
Managing a Poison Ivy, [place], or [employer] Reaction  If you come in contact with urushiol    If you think you may have come in contact with the sap oil (urushiol) contained in poison ivy, poison oak, or poison sumac plants, it's important to wash your rash may last up to 3 weeks. In the future, try to avoid coming in contact with these plants.    When to call your healthcare provider  Call your healthcare provider or seek immediate medical attention if:   · Your rash is severe  · The rash spreads be

## 2021-07-27 NOTE — ASSESSMENT & PLAN NOTE
Check bp daily x 2 weeks  Call/message results   Discussed diet, exercise and family medical history re:htn

## 2021-07-27 NOTE — PROGRESS NOTES
HPI  Pt presents for itchy, burning, blistery rash on right forearm for the past week. Was working in garden and pulled some weeds-uncertain if poison ivy, oak or sumac.    Has been using over the counter hc cream without improvement.     + family medical h Exposure: Not Asked        Hobby Hazards: Not Asked        Sleep Concern: Not Asked        Stress Concern: Not Asked        Weight Concern: Not Asked        Special Diet: Not Asked        Back Care: Not Asked        Exercise: Not Asked        Bike Helmet: Rate and Rhythm: Normal rate. Pulmonary:      Effort: Pulmonary effort is normal. No respiratory distress. Skin:     General: Skin is warm and dry. Findings: Rash present. Rash is urticarial and vesicular.           Neurological:      Mental Sta

## 2022-03-07 ENCOUNTER — TELEPHONE (OUTPATIENT)
Dept: FAMILY MEDICINE CLINIC | Facility: CLINIC | Age: 57
End: 2022-03-07

## 2022-04-06 ENCOUNTER — LAB ENCOUNTER (OUTPATIENT)
Dept: LAB | Age: 57
End: 2022-04-06
Attending: FAMILY MEDICINE
Payer: COMMERCIAL

## 2022-04-06 DIAGNOSIS — Z12.11 SPECIAL SCREENING FOR MALIGNANT NEOPLASMS, COLON: ICD-10-CM

## 2022-04-06 LAB — HEMOCCULT STL QL: NEGATIVE

## 2022-04-06 PROCEDURE — 82274 ASSAY TEST FOR BLOOD FECAL: CPT

## 2022-05-03 RX ORDER — EPINEPHRINE 0.3 MG/.3ML
INJECTION SUBCUTANEOUS
Qty: 3 EACH | Refills: 1 | Status: SHIPPED | OUTPATIENT
Start: 2022-05-03

## 2022-06-24 ENCOUNTER — OFFICE VISIT (OUTPATIENT)
Dept: FAMILY MEDICINE CLINIC | Facility: CLINIC | Age: 57
End: 2022-06-24
Payer: COMMERCIAL

## 2022-06-24 VITALS
DIASTOLIC BLOOD PRESSURE: 83 MMHG | SYSTOLIC BLOOD PRESSURE: 148 MMHG | RESPIRATION RATE: 16 BRPM | OXYGEN SATURATION: 98 % | HEART RATE: 87 BPM | TEMPERATURE: 99 F

## 2022-06-24 DIAGNOSIS — H61.22 IMPACTED CERUMEN OF LEFT EAR: Primary | ICD-10-CM

## 2022-06-24 PROCEDURE — 99202 OFFICE O/P NEW SF 15 MIN: CPT | Performed by: NURSE PRACTITIONER

## 2022-06-24 PROCEDURE — 3079F DIAST BP 80-89 MM HG: CPT | Performed by: NURSE PRACTITIONER

## 2022-06-24 PROCEDURE — 3077F SYST BP >= 140 MM HG: CPT | Performed by: NURSE PRACTITIONER

## 2022-06-24 NOTE — PROGRESS NOTES
Patient presents with:    Request for Ear Wax Removal    HPI: Patient presents for removal of ear wax. Sudden onset started bothering him today- feels very plugged and hearing suddenly decreased. Has ongoing issue with cerumen build up. Home treatments tried: None. Denies ear pain, fever, chills, ear drainage, or dizziness. Cerumen Removal Procedure  Patient gave verbal consent. Risks and Benefits of removal were discussed with the patient. Pt agreed to proceed with procedure. Location: Left ear  Indication: TM not visible, decreased hearing, feels plugged  Prep: Hydrogen Peroxide with warm water via sterile syringe  Removal: Otoscope visualization of cerumen and ear lavage was used to remove the wax  Patient Status: Tolerated Well; No complications      EXAM: TMs healthy, no injection, fluid, retraction. EACs healthy and without lesions. Assessment:  Cerumen impaction of left ear    Plan: Successful removal with ear lavage. Patient tolerated without complications    F/U as needed for cerumen removal or immediately if ever signs of ear infection post-procedure. Pt verbalizes understanding.

## 2022-12-29 ENCOUNTER — LAB ENCOUNTER (OUTPATIENT)
Dept: LAB | Facility: HOSPITAL | Age: 57
End: 2022-12-29
Attending: FAMILY MEDICINE
Payer: COMMERCIAL

## 2022-12-29 ENCOUNTER — OFFICE VISIT (OUTPATIENT)
Dept: INTERNAL MEDICINE CLINIC | Facility: CLINIC | Age: 57
End: 2022-12-29
Payer: COMMERCIAL

## 2022-12-29 VITALS
DIASTOLIC BLOOD PRESSURE: 80 MMHG | OXYGEN SATURATION: 98 % | BODY MASS INDEX: 35.1 KG/M2 | HEART RATE: 76 BPM | TEMPERATURE: 98 F | HEIGHT: 66.34 IN | SYSTOLIC BLOOD PRESSURE: 112 MMHG | WEIGHT: 221 LBS

## 2022-12-29 DIAGNOSIS — Z00.00 WELLNESS EXAMINATION: ICD-10-CM

## 2022-12-29 DIAGNOSIS — Z91.030 ALLERGIC TO BEES: ICD-10-CM

## 2022-12-29 DIAGNOSIS — Z12.5 PROSTATE CANCER SCREENING: ICD-10-CM

## 2022-12-29 DIAGNOSIS — Z23 NEED FOR VACCINATION: ICD-10-CM

## 2022-12-29 DIAGNOSIS — Z00.00 WELLNESS EXAMINATION: Primary | ICD-10-CM

## 2022-12-29 DIAGNOSIS — Z12.11 COLON CANCER SCREENING: ICD-10-CM

## 2022-12-29 LAB
ALBUMIN SERPL-MCNC: 4.5 G/DL (ref 3.4–5)
ALBUMIN/GLOB SERPL: 1.5 {RATIO} (ref 1–2)
ALP LIVER SERPL-CCNC: 80 U/L
ALT SERPL-CCNC: 67 U/L
ANION GAP SERPL CALC-SCNC: 6 MMOL/L (ref 0–18)
AST SERPL-CCNC: 21 U/L (ref 15–37)
BASOPHILS # BLD AUTO: 0.05 X10(3) UL (ref 0–0.2)
BASOPHILS NFR BLD AUTO: 0.7 %
BILIRUB SERPL-MCNC: 0.7 MG/DL (ref 0.1–2)
BILIRUB UR QL: NEGATIVE
BUN BLD-MCNC: 17 MG/DL (ref 7–18)
BUN/CREAT SERPL: 20 (ref 10–20)
CALCIUM BLD-MCNC: 9.5 MG/DL (ref 8.5–10.1)
CHLORIDE SERPL-SCNC: 107 MMOL/L (ref 98–112)
CHOLEST SERPL-MCNC: 152 MG/DL (ref ?–200)
CLARITY UR: CLEAR
CO2 SERPL-SCNC: 27 MMOL/L (ref 21–32)
COLOR UR: YELLOW
COMPLEXED PSA SERPL-MCNC: 1.14 NG/ML (ref ?–4)
CREAT BLD-MCNC: 0.85 MG/DL
DEPRECATED RDW RBC AUTO: 36.9 FL (ref 35.1–46.3)
EOSINOPHIL # BLD AUTO: 0.26 X10(3) UL (ref 0–0.7)
EOSINOPHIL NFR BLD AUTO: 3.5 %
ERYTHROCYTE [DISTWIDTH] IN BLOOD BY AUTOMATED COUNT: 12.2 % (ref 11–15)
EST. AVERAGE GLUCOSE BLD GHB EST-MCNC: 108 MG/DL (ref 68–126)
FASTING PATIENT LIPID ANSWER: YES
FASTING STATUS PATIENT QL REPORTED: YES
GFR SERPLBLD BASED ON 1.73 SQ M-ARVRAT: 103 ML/MIN/1.73M2 (ref 60–?)
GLOBULIN PLAS-MCNC: 3 G/DL (ref 2.8–4.4)
GLUCOSE BLD-MCNC: 94 MG/DL (ref 70–99)
GLUCOSE UR-MCNC: NEGATIVE MG/DL
HBA1C MFR BLD: 5.4 % (ref ?–5.7)
HCT VFR BLD AUTO: 46.6 %
HDLC SERPL-MCNC: 37 MG/DL (ref 40–59)
HGB BLD-MCNC: 16.3 G/DL
HGB UR QL STRIP.AUTO: NEGATIVE
IMM GRANULOCYTES # BLD AUTO: 0.03 X10(3) UL (ref 0–1)
IMM GRANULOCYTES NFR BLD: 0.4 %
KETONES UR-MCNC: NEGATIVE MG/DL
LDLC SERPL CALC-MCNC: 91 MG/DL (ref ?–100)
LEUKOCYTE ESTERASE UR QL STRIP.AUTO: NEGATIVE
LYMPHOCYTES # BLD AUTO: 2.33 X10(3) UL (ref 1–4)
LYMPHOCYTES NFR BLD AUTO: 31.5 %
MCH RBC QN AUTO: 29.6 PG (ref 26–34)
MCHC RBC AUTO-ENTMCNC: 35 G/DL (ref 31–37)
MCV RBC AUTO: 84.6 FL
MONOCYTES # BLD AUTO: 0.48 X10(3) UL (ref 0.1–1)
MONOCYTES NFR BLD AUTO: 6.5 %
NEUTROPHILS # BLD AUTO: 4.24 X10 (3) UL (ref 1.5–7.7)
NEUTROPHILS # BLD AUTO: 4.24 X10(3) UL (ref 1.5–7.7)
NEUTROPHILS NFR BLD AUTO: 57.4 %
NITRITE UR QL STRIP.AUTO: NEGATIVE
NONHDLC SERPL-MCNC: 115 MG/DL (ref ?–130)
OSMOLALITY SERPL CALC.SUM OF ELEC: 291 MOSM/KG (ref 275–295)
PH UR: 5.5 [PH] (ref 5–8)
PLATELET # BLD AUTO: 141 10(3)UL (ref 150–450)
POTASSIUM SERPL-SCNC: 3.9 MMOL/L (ref 3.5–5.1)
PROT SERPL-MCNC: 7.5 G/DL (ref 6.4–8.2)
PROT UR-MCNC: NEGATIVE MG/DL
RBC # BLD AUTO: 5.51 X10(6)UL
SODIUM SERPL-SCNC: 140 MMOL/L (ref 136–145)
SP GR UR STRIP: 1.02 (ref 1–1.03)
TRIGL SERPL-MCNC: 135 MG/DL (ref 30–149)
TSI SER-ACNC: 3.27 MIU/ML (ref 0.36–3.74)
UROBILINOGEN UR STRIP-ACNC: 0.2
VLDLC SERPL CALC-MCNC: 22 MG/DL (ref 0–30)
WBC # BLD AUTO: 7.4 X10(3) UL (ref 4–11)

## 2022-12-29 PROCEDURE — 36415 COLL VENOUS BLD VENIPUNCTURE: CPT

## 2022-12-29 PROCEDURE — 80061 LIPID PANEL: CPT

## 2022-12-29 PROCEDURE — 90750 HZV VACC RECOMBINANT IM: CPT | Performed by: FAMILY MEDICINE

## 2022-12-29 PROCEDURE — 81003 URINALYSIS AUTO W/O SCOPE: CPT

## 2022-12-29 PROCEDURE — 84443 ASSAY THYROID STIM HORMONE: CPT

## 2022-12-29 PROCEDURE — 83036 HEMOGLOBIN GLYCOSYLATED A1C: CPT

## 2022-12-29 PROCEDURE — 3079F DIAST BP 80-89 MM HG: CPT | Performed by: FAMILY MEDICINE

## 2022-12-29 PROCEDURE — 80053 COMPREHEN METABOLIC PANEL: CPT

## 2022-12-29 PROCEDURE — 90471 IMMUNIZATION ADMIN: CPT | Performed by: FAMILY MEDICINE

## 2022-12-29 PROCEDURE — 3008F BODY MASS INDEX DOCD: CPT | Performed by: FAMILY MEDICINE

## 2022-12-29 PROCEDURE — 99396 PREV VISIT EST AGE 40-64: CPT | Performed by: FAMILY MEDICINE

## 2022-12-29 PROCEDURE — 3074F SYST BP LT 130 MM HG: CPT | Performed by: FAMILY MEDICINE

## 2022-12-29 PROCEDURE — 85025 COMPLETE CBC W/AUTO DIFF WBC: CPT | Performed by: FAMILY MEDICINE

## 2023-02-06 ENCOUNTER — NURSE ONLY (OUTPATIENT)
Facility: CLINIC | Age: 58
End: 2023-02-06

## 2023-02-06 DIAGNOSIS — Z12.11 SCREEN FOR COLON CANCER: Primary | ICD-10-CM

## 2023-02-06 RX ORDER — POLYETHYLENE GLYCOL 3350, SODIUM CHLORIDE, SODIUM BICARBONATE, POTASSIUM CHLORIDE 420; 11.2; 5.72; 1.48 G/4L; G/4L; G/4L; G/4L
POWDER, FOR SOLUTION ORAL
Qty: 4000 ML | Refills: 0 | Status: SHIPPED | OUTPATIENT
Start: 2023-02-06

## 2023-02-23 ENCOUNTER — TELEPHONE (OUTPATIENT)
Dept: INTERNAL MEDICINE CLINIC | Facility: CLINIC | Age: 58
End: 2023-02-23

## 2023-02-23 DIAGNOSIS — Z12.11 SCREENING FOR COLON CANCER: Primary | ICD-10-CM

## 2023-04-05 ENCOUNTER — ORDER TRANSCRIPTION (OUTPATIENT)
Dept: ADMINISTRATIVE | Facility: HOSPITAL | Age: 58
End: 2023-04-05

## 2023-04-05 DIAGNOSIS — Z13.6 SCREENING FOR CARDIOVASCULAR CONDITION: Primary | ICD-10-CM

## 2023-04-17 ENCOUNTER — SURGERY CENTER DOCUMENTATION (OUTPATIENT)
Dept: SURGERY | Age: 58
End: 2023-04-17

## 2023-04-17 ENCOUNTER — LAB REQUISITION (OUTPATIENT)
Dept: SURGERY | Age: 58
End: 2023-04-17
Payer: COMMERCIAL

## 2023-04-17 DIAGNOSIS — Z12.11 COLON CANCER SCREENING: ICD-10-CM

## 2023-04-17 DIAGNOSIS — K63.5 POLYP OF COLON, UNSPECIFIED PART OF COLON, UNSPECIFIED TYPE: ICD-10-CM

## 2023-04-17 PROCEDURE — 88305 TISSUE EXAM BY PATHOLOGIST: CPT | Performed by: INTERNAL MEDICINE

## 2023-05-01 ENCOUNTER — OFFICE VISIT (OUTPATIENT)
Dept: FAMILY MEDICINE CLINIC | Facility: CLINIC | Age: 58
End: 2023-05-01

## 2023-05-01 ENCOUNTER — TELEPHONE (OUTPATIENT)
Dept: GASTROENTEROLOGY | Facility: CLINIC | Age: 58
End: 2023-05-01

## 2023-05-01 VITALS
SYSTOLIC BLOOD PRESSURE: 138 MMHG | HEIGHT: 66 IN | DIASTOLIC BLOOD PRESSURE: 78 MMHG | BODY MASS INDEX: 35.36 KG/M2 | HEART RATE: 76 BPM | WEIGHT: 220 LBS

## 2023-05-01 DIAGNOSIS — R03.0 BORDERLINE HIGH BLOOD PRESSURE: ICD-10-CM

## 2023-05-01 DIAGNOSIS — Z91.030 ALLERGIC TO BEES: Primary | ICD-10-CM

## 2023-05-01 PROCEDURE — 3078F DIAST BP <80 MM HG: CPT | Performed by: FAMILY MEDICINE

## 2023-05-01 PROCEDURE — 99203 OFFICE O/P NEW LOW 30 MIN: CPT | Performed by: FAMILY MEDICINE

## 2023-05-01 PROCEDURE — 3075F SYST BP GE 130 - 139MM HG: CPT | Performed by: FAMILY MEDICINE

## 2023-05-01 PROCEDURE — 3008F BODY MASS INDEX DOCD: CPT | Performed by: FAMILY MEDICINE

## 2023-05-01 RX ORDER — EPINEPHRINE 0.3 MG/.3ML
INJECTION SUBCUTANEOUS
Qty: 3 EACH | Refills: 1 | Status: SHIPPED | OUTPATIENT
Start: 2023-05-01

## 2023-05-01 NOTE — TELEPHONE ENCOUNTER
Health Maintenance Updated. 3 year colonoscopy recall entered into patient outreach in [address] [place] [address] Rd. Next colonoscopy will be due 4/17/2026.

## 2023-05-05 ENCOUNTER — MED REC SCAN ONLY (OUTPATIENT)
Facility: CLINIC | Age: 58
End: 2023-05-05

## 2023-05-06 ENCOUNTER — HOSPITAL ENCOUNTER (OUTPATIENT)
Dept: CT IMAGING | Age: 58
Discharge: HOME | End: 2023-05-06
Attending: FAMILY MEDICINE

## 2023-05-06 DIAGNOSIS — Z13.6 SCREENING FOR CARDIOVASCULAR CONDITION: ICD-10-CM

## 2025-04-22 DIAGNOSIS — Z91.030 ALLERGIC TO BEES: ICD-10-CM

## 2025-04-24 RX ORDER — EPINEPHRINE 0.3 MG/.3ML
INJECTION SUBCUTANEOUS
Qty: 1 EACH | Refills: 2 | OUTPATIENT
Start: 2025-04-24

## 2025-04-24 NOTE — TELEPHONE ENCOUNTER
Please review. Protocol Failed; No Protocol    Future Appointments   Date Time Provider Department Center   6/10/2025 10:20 AM Marianela Gonzalez APRN [facility]

## 2025-04-25 NOTE — PROGRESS NOTES
Subjective:   Sp Burton is a 59 year old male who presents for Physical (Lab order, refill: Epipen )   Patient is a pleasant 59-year-old male past medical history significant for hyperlipidemia and bee allergy.  Patient presents office today new to this provider for annual physical.  Patient states his health is good, but he can losr 20 lbs.     Diet: tries to eat well. Likes meat. Does eat veggies. Mostly home cooked and grilling.  Exercise: Walking and staying active everyday. Can walk for hours if needed  Sleep: 8 hours per night. No trouble falling asleep or staying asleep.   Stress: minimal. Likes to have alone time if stressed.   Social: Single. 1 child. Lives with mother in home [place].   Sexually Active: on occasion  Prophylaxis: Condoms if needed  Alcohol: on occasion, nothing in excess, beer  Tobacco: none   Work: Retired. 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  Vaccinations: Shingles, tetanus, Prevnar 20. Nurse visit for tetanus and pna   Colon: Completed MD Yanes 4/17/23, tubular adenoma, recommend 3-year follow-up 2026        Past Medical History[1]   Past Surgical History[2]     History/Other:    Chief Complaint Reviewed and Verified  Nursing Notes Reviewed and   Verified  Tobacco Reviewed  Allergies Reviewed  Medications Reviewed    Problem List Reviewed  Medical History Reviewed  Surgical History   Reviewed  Family History Reviewed  Social History Reviewed         Tobacco:  He has never smoked tobacco.    Current Medications[3]      Review of Systems:  Review of Systems   Constitutional: Negative.  Negative for activity change, chills and fever.   HENT: Negative.  Negative for congestion, ear pain, postnasal drip, sinus pain, sore throat and trouble swallowing.    Respiratory: Negative.  Negative for cough, shortness of breath and wheezing.    Cardiovascular: Negative.  Negative for chest pain and leg swelling.   Gastrointestinal: Negative.  Negative for  abdominal pain, blood in stool, constipation, diarrhea, nausea and vomiting.   Endocrine: Negative.    Genitourinary: Negative.  Negative for difficulty urinating, dysuria and flank pain.   Musculoskeletal: Negative.  Negative for arthralgias, back pain and neck stiffness.   Skin: Negative.  Negative for color change and rash.   Neurological: Negative.  Negative for dizziness and headaches.   Hematological:  Negative for adenopathy.         Objective:   /77 (BP Location: Right arm, Patient Position: Sitting, Cuff Size: large)   Pulse 67   Ht 5' 6\" (1.676 m)   Wt 218 lb 6.4 oz (99.1 kg)   SpO2 97%   BMI 35.25 kg/m²  Estimated body mass index is 35.25 kg/m² as calculated from the following:    Height as of this encounter: 5' 6\" (1.676 m).    Weight as of this encounter: 218 lb 6.4 oz (99.1 kg).  Physical Exam  Vitals and nursing note reviewed.   Constitutional:       Appearance: Normal appearance. He is obese.   HENT:      Head: Normocephalic.      Right Ear: Tympanic membrane, ear canal and external ear normal.      Left Ear: Tympanic membrane, ear canal and external ear normal.      Nose: Nose normal.      Mouth/Throat:      Mouth: Mucous membranes are moist.   Eyes:      Extraocular Movements: Extraocular movements intact.      Pupils: Pupils are equal, round, and reactive to light.   Cardiovascular:      Rate and Rhythm: Normal rate and regular rhythm.      Pulses: Normal pulses.      Heart sounds: Normal heart sounds. No murmur heard.  Pulmonary:      Effort: Pulmonary effort is normal. No respiratory distress.      Breath sounds: Normal breath sounds. No wheezing.   Abdominal:      General: There is no distension.      Palpations: Abdomen is soft.      Tenderness: There is no abdominal tenderness.   Musculoskeletal:         General: Normal range of motion.      Cervical back: Normal range of motion and neck supple.      Right lower leg: No edema.      Left lower leg: No edema.   Lymphadenopathy:       Cervical: No cervical adenopathy.   Skin:     General: Skin is warm and dry.      Capillary Refill: Capillary refill takes less than 2 seconds.      Findings: No rash.   Neurological:      General: No focal deficit present.      Mental Status: He is alert and oriented to person, place, and time.   Psychiatric:         Mood and Affect: Mood normal.         Behavior: Behavior normal.           Assessment & Plan:   1. Encounter for wellness examination in adult (Primary)  -     Hemoglobin A1C; Future; Expected date: 04/28/2025  -     CBC With Differential With Platelet; Future; Expected date: 04/28/2025  -     Comp Metabolic Panel (14); Future; Expected date: 04/28/2025  -     TSH W Reflex To Free T4; Future; Expected date: 04/28/2025  -     Lipid Panel; Future; Expected date: 04/28/2025  -     PSA Total, Screen; Future; Expected date: 04/28/2025  -     EKG 12 Lead; Future; Expected date: 04/28/2025  -     Urinalysis with Culture Reflex; Future; Expected date: 05/12/2025  2. Need for tetanus booster  -     TETANUS, DIPHTHERIA TOXOIDS AND ACELLULAR PERTUSIS VACCINE (TDAP), >7 YEARS, IM USE  3. Need for pneumococcal vaccine  -     Prevnar 20 (PCV20) [21967]  4. Screening PSA (prostate specific antigen)  -     PSA Total, Screen; Future; Expected date: 04/28/2025  5. Allergic to bees  -     EPINEPHrine; INJECT 0.3 AS DIRECTED ON TIME  Dispense: 3 each; Refill: 1  6. Obesity, unspecified class, unspecified obesity type, unspecified whether serious comorbidity present  -     EKG 12 Lead; Future; Expected date: 04/28/2025  -     Urinalysis with Culture Reflex; Future; Expected date: 05/12/2025  7. Elevated blood pressure reading in office without diagnosis of hypertension    1. Encounter for wellness examination in adult  Wellness labs ordered.  Encouraged increasing physical activity which includes raising heart rate 3 to 5 days/week for at least 30 minutes at a time, while also performing mild strength exercises.  Patient  counseled on importance of dietary modifications, which may include limiting fats, red meat, and carbohydrates, while increasing fruit and vegetable intake, and trying to adhere to a low sodium/Mediterranean diet.  Encouraged to manage stress.  Encouraged good sleep habits.  Encouraged good sexual health.    - Hemoglobin A1C; Future  - CBC With Differential With Platelet; Future  - Comp Metabolic Panel (14); Future  - TSH W Reflex To Free T4; Future  - Lipid Panel; Future  - PSA Total, Screen; Future    2. Need for tetanus booster  Tetanus ordered for nurse visit  - TETANUS, DIPHTHERIA TOXOIDS AND ACELLULAR PERTUSIS VACCINE (TDAP), >7 YEARS, IM USE    3. Need for pneumococcal vaccine  Prevnar 20 ordered for nurse visit  - Prevnar 20 (PCV20) [12370]    4. Screening PSA (prostate specific antigen)  PSA added to wellness labs  - PSA Total, Screen; Future    5. Allergic to bees  Refill epi per request  - EPINEPHrine 0.3 MG/0.3ML Injection Solution Auto-injector; INJECT 0.3 AS DIRECTED ON TIME  Dispense: 3 each; Refill: 1    6. Obesity, unspecified class, unspecified obesity type, unspecified whether serious comorbidity present  -BMI in office today 35.25  -Recommend low fat/low cholesterol diet and mediterranean diet  -Advised to decrease the amount of carbohydrates in diet (bread products, rice, pasta, potatoes, cereals, starchy vegetables)  -Avoid fried, fatty, greasy foods, cheese, red-meats, egg yolks and processed foods  -Recommend to cut out foods and beverages that contain high sugars.  -Increase your vegetable intake.  -Consider over the counter metamucil once daily with 8 oz water to lower cholesterol   -Try to exercise at least 30 minutes per day 3-5 times per week with mixture of cardio and weight training.     7. Elevated blood pressure reading in office without diagnosis of hypertension  Elevated and confirmed  Denies CP, HA, Dizziness, and SOB  Educated on Dash diet  Follow up one month for recheck        Patient aware of plan of care. All questions answered to satisfaction of the patient. Patient instructed to call office or reach out via [facility] [employer] if any issues arise. For urgent issues and/or reviewed red flags please proceed to the urgent care or ER.  Also, inform the nurse practitioner with any new symptoms or medication side effects.        Return if symptoms worsen or fail to improve.    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

## 2025-04-28 ENCOUNTER — OFFICE VISIT (OUTPATIENT)
Dept: FAMILY MEDICINE CLINIC | Facility: CLINIC | Age: 60
End: 2025-04-28

## 2025-04-28 VITALS
SYSTOLIC BLOOD PRESSURE: 149 MMHG | BODY MASS INDEX: 35.1 KG/M2 | WEIGHT: 218.38 LBS | HEIGHT: 66 IN | DIASTOLIC BLOOD PRESSURE: 77 MMHG | HEART RATE: 67 BPM | OXYGEN SATURATION: 97 %

## 2025-04-28 DIAGNOSIS — Z00.00 ENCOUNTER FOR WELLNESS EXAMINATION IN ADULT: Primary | ICD-10-CM

## 2025-04-28 DIAGNOSIS — Z23 NEED FOR TETANUS BOOSTER: ICD-10-CM

## 2025-04-28 DIAGNOSIS — Z12.5 SCREENING PSA (PROSTATE SPECIFIC ANTIGEN): ICD-10-CM

## 2025-04-28 DIAGNOSIS — Z23 NEED FOR PNEUMOCOCCAL VACCINE: ICD-10-CM

## 2025-04-28 DIAGNOSIS — E66.9 OBESITY, UNSPECIFIED CLASS, UNSPECIFIED OBESITY TYPE, UNSPECIFIED WHETHER SERIOUS COMORBIDITY PRESENT: ICD-10-CM

## 2025-04-28 DIAGNOSIS — R03.0 ELEVATED BLOOD PRESSURE READING IN OFFICE WITHOUT DIAGNOSIS OF HYPERTENSION: ICD-10-CM

## 2025-04-28 DIAGNOSIS — Z91.030 ALLERGIC TO BEES: ICD-10-CM

## 2025-04-28 RX ORDER — EPINEPHRINE 0.3 MG/.3ML
INJECTION SUBCUTANEOUS
Qty: 3 EACH | Refills: 1 | Status: SHIPPED | OUTPATIENT
Start: 2025-04-28

## 2025-05-05 ENCOUNTER — LAB ENCOUNTER (OUTPATIENT)
Dept: LAB | Age: 60
End: 2025-05-05
Payer: COMMERCIAL

## 2025-05-05 DIAGNOSIS — Z12.5 SCREENING PSA (PROSTATE SPECIFIC ANTIGEN): ICD-10-CM

## 2025-05-05 DIAGNOSIS — E66.9 OBESITY, UNSPECIFIED CLASS, UNSPECIFIED OBESITY TYPE, UNSPECIFIED WHETHER SERIOUS COMORBIDITY PRESENT: ICD-10-CM

## 2025-05-05 DIAGNOSIS — Z00.00 ENCOUNTER FOR WELLNESS EXAMINATION IN ADULT: ICD-10-CM

## 2025-05-05 LAB
ALBUMIN SERPL-MCNC: 4.6 G/DL (ref 3.2–4.8)
ALBUMIN/GLOB SERPL: 2 {RATIO} (ref 1–2)
ALP LIVER SERPL-CCNC: 78 U/L (ref 45–117)
ALT SERPL-CCNC: 55 U/L (ref 10–49)
ANION GAP SERPL CALC-SCNC: 9 MMOL/L (ref 0–18)
AST SERPL-CCNC: 28 U/L (ref ?–34)
BASOPHILS # BLD AUTO: 0.03 X10(3) UL (ref 0–0.2)
BASOPHILS NFR BLD AUTO: 0.5 %
BILIRUB SERPL-MCNC: 0.8 MG/DL (ref 0.3–1.2)
BILIRUB UR QL: NEGATIVE
BUN BLD-MCNC: 16 MG/DL (ref 9–23)
BUN/CREAT SERPL: 19.8 (ref 10–20)
CALCIUM BLD-MCNC: 9.3 MG/DL (ref 8.7–10.4)
CHLORIDE SERPL-SCNC: 105 MMOL/L (ref 98–112)
CHOLEST SERPL-MCNC: 152 MG/DL (ref ?–200)
CLARITY UR: CLEAR
CO2 SERPL-SCNC: 26 MMOL/L (ref 21–32)
COLOR UR: YELLOW
COMPLEXED PSA SERPL-MCNC: 1.18 NG/ML (ref ?–4)
CREAT BLD-MCNC: 0.81 MG/DL (ref 0.7–1.3)
DEPRECATED RDW RBC AUTO: 39.3 FL (ref 35.1–46.3)
EGFRCR SERPLBLD CKD-EPI 2021: 103 ML/MIN/1.73M2 (ref 60–?)
EOSINOPHIL # BLD AUTO: 0.26 X10(3) UL (ref 0–0.7)
EOSINOPHIL NFR BLD AUTO: 4.4 %
ERYTHROCYTE [DISTWIDTH] IN BLOOD BY AUTOMATED COUNT: 12.7 % (ref 11–15)
EST. AVERAGE GLUCOSE BLD GHB EST-MCNC: 105 MG/DL (ref 68–126)
FASTING PATIENT LIPID ANSWER: YES
FASTING STATUS PATIENT QL REPORTED: YES
GLOBULIN PLAS-MCNC: 2.3 G/DL (ref 2–3.5)
GLUCOSE BLD-MCNC: 104 MG/DL (ref 70–99)
GLUCOSE UR-MCNC: NORMAL MG/DL
HBA1C MFR BLD: 5.3 % (ref ?–5.7)
HCT VFR BLD AUTO: 44.7 % (ref 39–53)
HDLC SERPL-MCNC: 42 MG/DL (ref 40–59)
HGB BLD-MCNC: 15.5 G/DL (ref 13–17.5)
HGB UR QL STRIP.AUTO: NEGATIVE
IMM GRANULOCYTES # BLD AUTO: 0.01 X10(3) UL (ref 0–1)
IMM GRANULOCYTES NFR BLD: 0.2 %
KETONES UR-MCNC: NEGATIVE MG/DL
LDLC SERPL CALC-MCNC: 94 MG/DL (ref ?–100)
LEUKOCYTE ESTERASE UR QL STRIP.AUTO: NEGATIVE
LYMPHOCYTES # BLD AUTO: 1.92 X10(3) UL (ref 1–4)
LYMPHOCYTES NFR BLD AUTO: 32.4 %
MCH RBC QN AUTO: 29.8 PG (ref 26–34)
MCHC RBC AUTO-ENTMCNC: 34.7 G/DL (ref 31–37)
MCV RBC AUTO: 86 FL (ref 80–100)
MONOCYTES # BLD AUTO: 0.41 X10(3) UL (ref 0.1–1)
MONOCYTES NFR BLD AUTO: 6.9 %
NEUTROPHILS # BLD AUTO: 3.29 X10 (3) UL (ref 1.5–7.7)
NEUTROPHILS # BLD AUTO: 3.29 X10(3) UL (ref 1.5–7.7)
NEUTROPHILS NFR BLD AUTO: 55.6 %
NITRITE UR QL STRIP.AUTO: NEGATIVE
NONHDLC SERPL-MCNC: 110 MG/DL (ref ?–130)
OSMOLALITY SERPL CALC.SUM OF ELEC: 291 MOSM/KG (ref 275–295)
PH UR: 5.5 [PH] (ref 5–8)
PLATELET # BLD AUTO: 148 10(3)UL (ref 150–450)
PLATELETS.RETICULATED NFR BLD AUTO: 21.2 % (ref 0–7)
POTASSIUM SERPL-SCNC: 3.8 MMOL/L (ref 3.5–5.1)
PROT SERPL-MCNC: 6.9 G/DL (ref 5.7–8.2)
PROT UR-MCNC: NEGATIVE MG/DL
RBC # BLD AUTO: 5.2 X10(6)UL (ref 4.3–5.7)
SODIUM SERPL-SCNC: 140 MMOL/L (ref 136–145)
SP GR UR STRIP: 1.03 (ref 1–1.03)
TRIGL SERPL-MCNC: 81 MG/DL (ref 30–149)
TSI SER-ACNC: 2.33 UIU/ML (ref 0.55–4.78)
UROBILINOGEN UR STRIP-ACNC: NORMAL
VLDLC SERPL CALC-MCNC: 13 MG/DL (ref 0–30)
WBC # BLD AUTO: 5.9 X10(3) UL (ref 4–11)

## 2025-05-05 PROCEDURE — 80053 COMPREHEN METABOLIC PANEL: CPT

## 2025-05-05 PROCEDURE — 36415 COLL VENOUS BLD VENIPUNCTURE: CPT

## 2025-05-05 PROCEDURE — 85025 COMPLETE CBC W/AUTO DIFF WBC: CPT

## 2025-05-05 PROCEDURE — 81003 URINALYSIS AUTO W/O SCOPE: CPT

## 2025-05-05 PROCEDURE — 84443 ASSAY THYROID STIM HORMONE: CPT

## 2025-05-05 PROCEDURE — 80061 LIPID PANEL: CPT

## 2025-05-05 PROCEDURE — 83036 HEMOGLOBIN GLYCOSYLATED A1C: CPT

## 2025-07-15 ENCOUNTER — TELEPHONE (OUTPATIENT)
Dept: FAMILY MEDICINE CLINIC | Facility: CLINIC | Age: 60
End: 2025-07-15

## (undated) DIAGNOSIS — Z12.11 SPECIAL SCREENING FOR MALIGNANT NEOPLASMS, COLON: Primary | ICD-10-CM

## (undated) DIAGNOSIS — Z91.030 ALLERGIC TO BEES: ICD-10-CM

## (undated) NOTE — MR AVS SNAPSHOT
[facility]  [address]  [phone]               Thank you for choosing us for your health care visit with Poornima Connell MD.  We are glad to serve you and happy to provide you with this summary requirements for authorization, please wait 5-7 days and then contact your physician's   office. At that time, you will be provided with any authorization numbers or be assured that none are required. You can then schedule your appointment.  Failure to obta dairy products with reduced content of saturated and total fat.    Dietary sodium reduction Reduce dietary sodium intake to <= 100 mmol per day (2.4 g sodium or 6 g sodium chloride)   Aerobic physical activity Regular aerobic physical activity (e.g., brisk

## (undated) NOTE — LETTER
10/14/19        Sp Burton  [address] [place] [address]      Dear Nneka Winkler records indicate that you have outstanding lab work and or testing that was ordered for you and has not yet been completed:  Orders Placed This Encounter      C

## (undated) NOTE — MR AVS SNAPSHOT
[facility]  [address]  [phone]               Thank you for choosing us for your health care visit with Betzaida Duvall.   We are glad to serve you and happy to provide you with this summary

## (undated) NOTE — ED AVS SNAPSHOT
[facility] Emergency Department    [address] [address]     [place] [address]    Phone:  [phone]    Fax:  [address]   MRN: [id number]    Department:  [facility] Emergency Department   Date of Visit:  2/7/2 and Class Registration line at [phone] or find a doctor online by visiting [email or web address].    IF THERE IS ANY CHANGE OR WORSENING OF YOUR CONDITION, CALL YOUR PRIMARY CARE PHYSICIAN AT ONCE OR RETURN IMMEDIATELY TO [address] [place] [address].     If

## (undated) NOTE — ED AVS SNAPSHOT
[facility]    [address]     [facility] [address]    Phone:  [phone]    Fax:  [address]   MRN: [id number]    Department:  [facility] Department   Date of Visit:  2/7/2 full course of antibiotics as prescribed. Use the finger splint to help from extreme bending in order to not compromise the sutures that have been placed. You should be washing/cleaning the wound 3x daily with regular soap and water.  Pat dry, apply antib a substitute for ongoing medical care. Often, one Emergency Department visit does not uncover every injury or illness.  If you have been referred to a primary care or a specialist physician for a follow-up visit, please tell this physician (or your personal [address] [facility] [address] ([facility] [address]) [phone]   [facility] [address] [employer] ([address] [facility]) [address] ([address] [facility]) [phone]   Nidia Carreno [address] [employer]  [facility]

## (undated) NOTE — LETTER
01/14/20        Sp Burton  [address] [address]      Dear Cristino Calderón records indicate that you have outstanding lab work and or testing that was ordered for you and has not yet been completed:  Orders Placed This Encounter      C